# Patient Record
Sex: FEMALE | Race: WHITE | NOT HISPANIC OR LATINO | URBAN - METROPOLITAN AREA
[De-identification: names, ages, dates, MRNs, and addresses within clinical notes are randomized per-mention and may not be internally consistent; named-entity substitution may affect disease eponyms.]

---

## 2019-03-14 ENCOUNTER — TELEPHONE (OUTPATIENT)
Dept: INTERNAL MEDICINE CLINIC | Facility: CLINIC | Age: 34
End: 2019-03-14

## 2019-03-14 ENCOUNTER — OFFICE VISIT (OUTPATIENT)
Dept: INTERNAL MEDICINE CLINIC | Facility: CLINIC | Age: 34
End: 2019-03-14
Payer: COMMERCIAL

## 2019-03-14 VITALS
OXYGEN SATURATION: 95 % | SYSTOLIC BLOOD PRESSURE: 112 MMHG | BODY MASS INDEX: 17.21 KG/M2 | DIASTOLIC BLOOD PRESSURE: 78 MMHG | WEIGHT: 100.8 LBS | TEMPERATURE: 98.1 F | HEART RATE: 94 BPM | HEIGHT: 64 IN

## 2019-03-14 DIAGNOSIS — K21.9 GASTROESOPHAGEAL REFLUX DISEASE WITHOUT ESOPHAGITIS: ICD-10-CM

## 2019-03-14 DIAGNOSIS — Z13.21 ENCOUNTER FOR VITAMIN DEFICIENCY SCREENING: ICD-10-CM

## 2019-03-14 DIAGNOSIS — Z13.29 SCREENING FOR THYROID DISORDER: ICD-10-CM

## 2019-03-14 DIAGNOSIS — E55.9 VITAMIN D DEFICIENCY: ICD-10-CM

## 2019-03-14 DIAGNOSIS — Z13.220 SCREENING FOR HYPERLIPIDEMIA: ICD-10-CM

## 2019-03-14 DIAGNOSIS — B97.7 HPV (HUMAN PAPILLOMA VIRUS) INFECTION: ICD-10-CM

## 2019-03-14 DIAGNOSIS — Z13.1 SCREENING FOR DIABETES MELLITUS: ICD-10-CM

## 2019-03-14 DIAGNOSIS — F41.9 ANXIETY: Primary | ICD-10-CM

## 2019-03-14 DIAGNOSIS — N83.209 CYST OF OVARY, UNSPECIFIED LATERALITY: ICD-10-CM

## 2019-03-14 PROCEDURE — 3008F BODY MASS INDEX DOCD: CPT | Performed by: INTERNAL MEDICINE

## 2019-03-14 PROCEDURE — 99203 OFFICE O/P NEW LOW 30 MIN: CPT | Performed by: INTERNAL MEDICINE

## 2019-03-14 RX ORDER — DEXTROAMPHETAMINE SACCHARATE, AMPHETAMINE ASPARTATE MONOHYDRATE, DEXTROAMPHETAMINE SULFATE AND AMPHETAMINE SULFATE 6.25; 6.25; 6.25; 6.25 MG/1; MG/1; MG/1; MG/1
CAPSULE, EXTENDED RELEASE ORAL
Refills: 0 | COMMUNITY
Start: 2019-02-11

## 2019-03-14 RX ORDER — DEXTROAMPHETAMINE SULFATE, DEXTROAMPHETAMINE SACCHARATE, AMPHETAMINE ASPARTATE MONOHYDRATE, AND AMPHETAMINE SULFATE 9.375; 9.375; 9.375; 9.375 MG/1; MG/1; MG/1; MG/1
CAPSULE, EXTENDED RELEASE ORAL
Refills: 0 | COMMUNITY
Start: 2019-03-05

## 2019-03-14 RX ORDER — DEXTROAMPHETAMINE SACCHARATE, AMPHETAMINE ASPARTATE, DEXTROAMPHETAMINE SULFATE AND AMPHETAMINE SULFATE 5; 5; 5; 5 MG/1; MG/1; MG/1; MG/1
TABLET ORAL
Refills: 0 | COMMUNITY
Start: 2019-02-11

## 2019-03-14 RX ORDER — ALPRAZOLAM 0.5 MG/1
TABLET ORAL
Refills: 2 | COMMUNITY
Start: 2019-03-05

## 2019-03-14 RX ORDER — DROSPIRENONE/ETHINYL ESTRADIOL/LEVOMEFOLATE CALCIUM AND LEVOMEFOLATE CALCIUM 3-0.02(24)
KIT ORAL
COMMUNITY
Start: 2019-02-07

## 2019-03-14 RX ORDER — DEXTROAMPHETAMINE SACCHARATE, AMPHETAMINE ASPARTATE, DEXTROAMPHETAMINE SULFATE AND AMPHETAMINE SULFATE 3.75; 3.75; 3.75; 3.75 MG/1; MG/1; MG/1; MG/1
TABLET ORAL
Refills: 0 | COMMUNITY
Start: 2019-03-05

## 2019-03-14 NOTE — PROGRESS NOTES
Assessment/Plan:    #Anxiety/Depression  -reports symptoms worsened since October/November, reports significant stress and feeling overwhelmed from work and personal relationships with significant other  -denies homicidal or suicidal ideation, reports decreased interest in hobbies  -Has been seeing psychiatry who put her on xanax and adderall which initially provided benefit however no longer helping  -recommended that she wean off adderall due to recent weight loss as well as insomnia  -will refer to psychiatry for evaluation    #Weight Loss  -reports that 1 year ago she signed up for a weight loss class and was participating in spin class with goal of 30 lbs weight loss to get to goal 115lbs  -since October, patient reports she has lost 14lbs and is now down to 101lbs, reports no decreased in appetite  -denies bulemia or anorexia  -states that she feels stressed and does not want to eat    #GERD  -history of and underwent EGD with GI revealing gastritis and hiatal hernia, was recommended to start omeprazole which patient is taking sparingly  -also underwent colonoscopy for completeness and noted to have polyps that were removed  -plans to see GI later this year for repeat EGD    #Ovarian Cyst  -underwent surgical resection and currently on birth control    #HPV  -previous infection despite having vaccination  -sees ob/gyn every 6 months and had multiple colposcopies    #Colon Polyps  -seen on previous colonoscopy and is due for repeat in a few years    #Alcohol Use  -drinks between 1 glass to 1 bottle of wine per night  -encouraged patient to limit alcohol to 1-2 glasses daily    #Family History of CAD  -positive in grandfather with CABG  -will check risk factors including a1c and lipid profile    #Health Maintenance  -routine labs and followup in 6 months  -colonoscopy due in a couple years  -flu vaccine up to date 2018  -sees obgyn on an annual basis for pap and pelvic    Addendum 3/18/19 patient reports that seeing psychiatry in the [facility] is too far for her and she will contact her insurance company to find a different psychiatrist in [place]  Patient completed routine labs revealing , HLD 83, a1c 5 5, vitamin D 37 1, TSH 0 873  No problem-specific Assessment & Plan notes found for this encounter  Diagnoses and all orders for this visit:    Anxiety  -     CBC and differential; Future  -     Lipid Panel with Direct LDL reflex; Future  -     Vitamin D 25 hydroxy; Future  -     Hemoglobin A1C; Future  -     TSH, 3rd generation with Free T4 reflex; Future  -     Comprehensive metabolic panel; Future  -     Cancel: Ambulatory referral to Psychiatry; Future  -     Ambulatory referral to Psychiatry; Future    Screening for diabetes mellitus  -     CBC and differential; Future  -     Lipid Panel with Direct LDL reflex; Future  -     Vitamin D 25 hydroxy; Future  -     Hemoglobin A1C; Future  -     TSH, 3rd generation with Free T4 reflex; Future  -     Comprehensive metabolic panel; Future    Screening for hyperlipidemia  -     CBC and differential; Future  -     Lipid Panel with Direct LDL reflex; Future  -     Vitamin D 25 hydroxy; Future  -     Hemoglobin A1C; Future  -     TSH, 3rd generation with Free T4 reflex; Future  -     Comprehensive metabolic panel; Future    Screening for thyroid disorder  -     CBC and differential; Future  -     Lipid Panel with Direct LDL reflex; Future  -     Vitamin D 25 hydroxy; Future  -     Hemoglobin A1C; Future  -     TSH, 3rd generation with Free T4 reflex; Future  -     Comprehensive metabolic panel; Future    Vitamin D deficiency  -     CBC and differential; Future  -     Lipid Panel with Direct LDL reflex; Future  -     Vitamin D 25 hydroxy; Future  -     Hemoglobin A1C; Future  -     TSH, 3rd generation with Free T4 reflex; Future  -     Comprehensive metabolic panel;  Future    Encounter for vitamin deficiency screening  -     CBC and differential; Future  -     Lipid Panel with Direct LDL reflex; Future  -     Vitamin D 25 hydroxy; Future  -     Hemoglobin A1C; Future  -     TSH, 3rd generation with Free T4 reflex; Future  -     Comprehensive metabolic panel; Future    Cyst of ovary, unspecified laterality  -     CBC and differential; Future  -     Lipid Panel with Direct LDL reflex; Future  -     Vitamin D 25 hydroxy; Future  -     Hemoglobin A1C; Future  -     TSH, 3rd generation with Free T4 reflex; Future  -     Comprehensive metabolic panel; Future    Gastroesophageal reflux disease without esophagitis  -     CBC and differential; Future  -     Lipid Panel with Direct LDL reflex; Future  -     Vitamin D 25 hydroxy; Future  -     Hemoglobin A1C; Future  -     TSH, 3rd generation with Free T4 reflex; Future  -     Comprehensive metabolic panel; Future    Other orders  -     ALPRAZolam (XANAX) 0 5 mg tablet; TK 1 T PO BID PRN  -     amphetamine-dextroamphetamine (ADDERALL XR) 25 MG 24 hr capsule; TK 1 C PO ONCE D IN THE MORNING  -     amphetamine-dextroamphetamine (ADDERALL) 20 mg tablet; TK 1 T PO BID AT 7AM AND 1 PM  -     amphetamine-dextroamphetamine (ADDERALL) 15 MG tablet; TK 1 T PO TID  -     MYDAYIS 37 5 MG CP24; TK 1 C PO QAM  -     BEYAZ 3-0 02-0 451 MG TABS            Current Outpatient Medications:     ALPRAZolam (XANAX) 0 5 mg tablet, TK 1 T PO BID PRN, Disp: , Rfl: 2    amphetamine-dextroamphetamine (ADDERALL) 15 MG tablet, TK 1 T PO TID, Disp: , Rfl: 0    MYDAYIS 37 5 MG CP24, TK 1 C PO QAM, Disp: , Rfl: 0    amphetamine-dextroamphetamine (ADDERALL XR) 25 MG 24 hr capsule, TK 1 C PO ONCE D IN THE MORNING, Disp: , Rfl: 0    amphetamine-dextroamphetamine (ADDERALL) 20 mg tablet, TK 1 T PO BID AT 7AM AND 1 PM, Disp: , Rfl: 0    BEYAZ 3-0 02-0 451 MG TABS, , Disp: , Rfl:     Subjective:      Patient ID: Cindy Payton is a 36 y o  female  HPI     Patient presents as a new patient visit   Reports that she has been experiencing significant stress at work as well as interpersonal relationships  She reports feeling tired, fatigues, frustrated, anxious, and overworked  States that the feels started in November and has not improved  Has been seeing a psychologist and talking about the issues with family however it is not working  Currently on adderall and xanax which she is taking without relief  Denies suicidal or homicidal ideation  States that things she used to do are no longer interesting to her  She reports significant weight loss due to the anxiety  1 year ago enrolled in a weight loss class with weight loss goal of 30lbs down to 115lbs however currently down to 101lbs since she has been stressed at work  Was initially doing spinning class however no longer going to the gym  Encouraged patient to cut back on adderall since it may contribute to increase risk of cardiac issues and also contribute to weight loss and insomnia  Will refer patient to psychiatry for evaluation and second opinion  PMHx positive for gerd, anxiety, ovarian cyst, HPV  She takes adderall, xanax, omperazole as needed, and mydayis  Denies drug allergies  Previous surgery includes colposcopy for HPV infection, EGD for gerd revealing gastritis and hiatal hernia and colonoscopy revealing polyps  Patients family history if positive for grandfather with colon cancer in his 46s, melanoma in uncle, aunt liver ca, grandfather with CABG  She denies tobacco, or drug use  Drinks alcohol ranging from 1 glass to sometimes a bottle infrequently  We encouraged her to cut down to 1-2 glasses per day  Patient will obtain routine labs today and followup in 6 months  She will undergo repeat EGD later this year as well as followup with her ob/gyn for pap and pelvic exam  She is up to date on her flu vaccine 2018      The following portions of the patient's history were reviewed and updated as appropriate: allergies, current medications, past family history, past medical history, past social history, past surgical history and problem list     Review of Systems   Constitutional: Positive for appetite change  Negative for activity change, chills, fatigue and fever  HENT: Negative for congestion, dental problem, ear pain, facial swelling, hearing loss, postnasal drip, rhinorrhea, sinus pressure, sinus pain, sore throat, tinnitus and trouble swallowing  Eyes: Negative for photophobia and visual disturbance  Respiratory: Negative for cough, chest tightness, shortness of breath and wheezing  Cardiovascular: Negative for chest pain, palpitations and leg swelling  Gastrointestinal: Negative for abdominal distention, abdominal pain, blood in stool, constipation, diarrhea, nausea and vomiting  Genitourinary: Negative for difficulty urinating, dysuria and pelvic pain  Musculoskeletal: Negative for arthralgias, back pain, gait problem, joint swelling, myalgias, neck pain and neck stiffness  Skin: Negative for rash and wound  Neurological: Negative for dizziness, tremors, light-headedness and headaches  Psychiatric/Behavioral: Positive for decreased concentration, dysphoric mood and sleep disturbance  Negative for agitation, behavioral problems, confusion, hallucinations, self-injury and suicidal ideas  The patient is nervous/anxious  The patient is not hyperactive  Objective:      /78 (BP Location: Left arm, Patient Position: Sitting, Cuff Size: Adult)   Pulse 94   Temp 98 1 °F (36 7 °C) (Tympanic)   Ht 5' 4" (1 626 m)   Wt 45 7 kg (100 lb 12 8 oz)   SpO2 95%   BMI 17 30 kg/m²          Physical Exam   Constitutional: She is oriented to person, place, and time  She appears well-developed and well-nourished  HENT:   Head: Normocephalic and atraumatic  Right Ear: External ear normal    Left Ear: External ear normal    Nose: Nose normal    Mouth/Throat: Oropharynx is clear and moist  No oropharyngeal exudate  Eyes: Pupils are equal, round, and reactive to light  Conjunctivae and EOM are normal  Right eye exhibits no discharge  Left eye exhibits no discharge  Neck: Normal range of motion  Neck supple  No JVD present  No thyromegaly present  Cardiovascular: Normal rate, regular rhythm, normal heart sounds and intact distal pulses  No murmur heard  Pulmonary/Chest: Effort normal and breath sounds normal  No stridor  No respiratory distress  She has no wheezes  She has no rales  Abdominal: Soft  Bowel sounds are normal  She exhibits no distension and no mass  There is no tenderness  There is no rebound and no guarding  Musculoskeletal: Normal range of motion  She exhibits no edema, tenderness or deformity  Lymphadenopathy:     She has no cervical adenopathy  Neurological: She is alert and oriented to person, place, and time  She has normal reflexes  Skin: Skin is warm  No rash noted  No erythema  Psychiatric: Her behavior is normal  Judgment and thought content normal    Depressed mood   Vitals reviewed

## 2019-03-15 LAB — HBA1C MFR BLD HPLC: 5.5 %

## 2019-03-19 LAB
25(OH)D3+25(OH)D2 SERPL-MCNC: 37.1 NG/ML (ref 30–100)
ALBUMIN SERPL-MCNC: 4.6 G/DL (ref 3.5–5.5)
ALBUMIN/GLOB SERPL: 1.8 {RATIO} (ref 1.2–2.2)
ALP SERPL-CCNC: 53 IU/L (ref 39–117)
ALT SERPL-CCNC: 11 IU/L (ref 0–32)
AST SERPL-CCNC: 14 IU/L (ref 0–40)
BASOPHILS # BLD AUTO: 0 X10E3/UL (ref 0–0.2)
BASOPHILS NFR BLD AUTO: 0 %
BILIRUB SERPL-MCNC: 0.3 MG/DL (ref 0–1.2)
BUN SERPL-MCNC: 14 MG/DL (ref 6–20)
BUN/CREAT SERPL: 18 (ref 9–23)
CALCIUM SERPL-MCNC: 9.5 MG/DL (ref 8.7–10.2)
CHLORIDE SERPL-SCNC: 104 MMOL/L (ref 96–106)
CHOLEST SERPL-MCNC: 222 MG/DL (ref 100–199)
CO2 SERPL-SCNC: 24 MMOL/L (ref 20–29)
CREAT SERPL-MCNC: 0.77 MG/DL (ref 0.57–1)
EOSINOPHIL # BLD AUTO: 0.2 X10E3/UL (ref 0–0.4)
EOSINOPHIL NFR BLD AUTO: 2 %
ERYTHROCYTE [DISTWIDTH] IN BLOOD BY AUTOMATED COUNT: 13 % (ref 12.3–15.4)
GLOBULIN SER-MCNC: 2.5 G/DL (ref 1.5–4.5)
GLUCOSE SERPL-MCNC: 95 MG/DL (ref 65–99)
HBA1C MFR BLD: 5.5 % (ref 4.8–5.6)
HCT VFR BLD AUTO: 40.4 % (ref 34–46.6)
HDLC SERPL-MCNC: 83 MG/DL
HGB BLD-MCNC: 13.8 G/DL (ref 11.1–15.9)
IMM GRANULOCYTES # BLD: 0 X10E3/UL (ref 0–0.1)
IMM GRANULOCYTES NFR BLD: 0 %
LABCORP COMMENT: NORMAL
LDLC SERPL CALC-MCNC: 104 MG/DL (ref 0–99)
LDLC/HDLC SERPL: 1.3 RATIO (ref 0–3.2)
LYMPHOCYTES # BLD AUTO: 2.7 X10E3/UL (ref 0.7–3.1)
LYMPHOCYTES NFR BLD AUTO: 31 %
MCH RBC QN AUTO: 29.7 PG (ref 26.6–33)
MCHC RBC AUTO-ENTMCNC: 34.2 G/DL (ref 31.5–35.7)
MCV RBC AUTO: 87 FL (ref 79–97)
MONOCYTES # BLD AUTO: 0.6 X10E3/UL (ref 0.1–0.9)
MONOCYTES NFR BLD AUTO: 7 %
NEUTROPHILS # BLD AUTO: 5.2 X10E3/UL (ref 1.4–7)
NEUTROPHILS NFR BLD AUTO: 60 %
PLATELET # BLD AUTO: 386 X10E3/UL (ref 150–379)
POTASSIUM SERPL-SCNC: 4.1 MMOL/L (ref 3.5–5.2)
PROT SERPL-MCNC: 7.1 G/DL (ref 6–8.5)
RBC # BLD AUTO: 4.64 X10E6/UL (ref 3.77–5.28)
SL AMB EGFR AFRICAN AMERICAN: 117 ML/MIN/1.73
SL AMB EGFR NON AFRICAN AMERICAN: 102 ML/MIN/1.73
SL AMB VLDL CHOLESTEROL CALC: 35 MG/DL (ref 5–40)
SODIUM SERPL-SCNC: 143 MMOL/L (ref 134–144)
TRIGL SERPL-MCNC: 174 MG/DL (ref 0–149)
TSH SERPL DL<=0.005 MIU/L-ACNC: 0.87 UIU/ML (ref 0.45–4.5)
WBC # BLD AUTO: 8.7 X10E3/UL (ref 3.4–10.8)

## 2020-03-26 ENCOUNTER — TELEPHONE (OUTPATIENT)
Dept: INTERNAL MEDICINE CLINIC | Facility: CLINIC | Age: 35
End: 2020-03-26

## 2020-03-26 NOTE — TELEPHONE ENCOUNTER
1st attempt    Called patient and left message asking for a call back so we can set up an physical or virtual visit